# Patient Record
Sex: MALE | Race: WHITE | Employment: OTHER | URBAN - METROPOLITAN AREA
[De-identification: names, ages, dates, MRNs, and addresses within clinical notes are randomized per-mention and may not be internally consistent; named-entity substitution may affect disease eponyms.]

---

## 2017-03-17 ENCOUNTER — TELEPHONE (OUTPATIENT)
Dept: FAMILY MEDICINE CLINIC | Age: 52
End: 2017-03-17

## 2017-05-16 ENCOUNTER — TELEPHONE (OUTPATIENT)
Dept: FAMILY MEDICINE CLINIC | Age: 52
End: 2017-05-16

## 2017-06-16 ENCOUNTER — OFFICE VISIT (OUTPATIENT)
Dept: FAMILY MEDICINE CLINIC | Age: 52
End: 2017-06-16
Payer: MEDICARE

## 2017-06-16 VITALS
SYSTOLIC BLOOD PRESSURE: 142 MMHG | HEART RATE: 112 BPM | WEIGHT: 239 LBS | BODY MASS INDEX: 32.41 KG/M2 | OXYGEN SATURATION: 97 % | DIASTOLIC BLOOD PRESSURE: 90 MMHG

## 2017-06-16 DIAGNOSIS — I10 ESSENTIAL HYPERTENSION: Primary | ICD-10-CM

## 2017-06-16 DIAGNOSIS — F41.9 ANXIETY: ICD-10-CM

## 2017-06-16 DIAGNOSIS — R73.09 ABNORMAL GLUCOSE: ICD-10-CM

## 2017-06-16 DIAGNOSIS — Z12.11 SCREEN FOR COLON CANCER: ICD-10-CM

## 2017-06-16 DIAGNOSIS — Z12.5 SCREENING FOR PROSTATE CANCER: ICD-10-CM

## 2017-06-16 PROCEDURE — 99214 OFFICE O/P EST MOD 30 MIN: CPT | Performed by: FAMILY MEDICINE

## 2017-06-16 RX ORDER — QUETIAPINE FUMARATE 100 MG/1
TABLET, FILM COATED ORAL
Refills: 1 | COMMUNITY
Start: 2017-06-05 | End: 2018-05-11 | Stop reason: SDUPTHER

## 2017-06-16 RX ORDER — BACLOFEN 10 MG/1
TABLET ORAL
Refills: 0 | COMMUNITY
Start: 2017-06-11

## 2017-06-16 RX ORDER — BUSPIRONE HYDROCHLORIDE 7.5 MG/1
7.5 TABLET ORAL 2 TIMES DAILY
Qty: 60 TABLET | Refills: 2 | Status: SHIPPED | OUTPATIENT
Start: 2017-06-16 | End: 2017-07-07 | Stop reason: DRUGHIGH

## 2017-06-16 RX ORDER — DEXTROAMPHETAMINE SACCHARATE, AMPHETAMINE ASPARTATE, DEXTROAMPHETAMINE SULFATE AND AMPHETAMINE SULFATE 2.5; 2.5; 2.5; 2.5 MG/1; MG/1; MG/1; MG/1
TABLET ORAL
Refills: 0 | COMMUNITY
Start: 2017-06-05 | End: 2018-05-11 | Stop reason: SDUPTHER

## 2017-06-30 ASSESSMENT — ENCOUNTER SYMPTOMS
COLOR CHANGE: 0
DIARRHEA: 0
NAUSEA: 0
FACIAL SWELLING: 0
VOMITING: 0
BACK PAIN: 0
ABDOMINAL DISTENTION: 0
COUGH: 0
ABDOMINAL PAIN: 0
CONSTIPATION: 0
WHEEZING: 0
BLURRED VISION: 0
TROUBLE SWALLOWING: 0
PHOTOPHOBIA: 0
ORTHOPNEA: 0
SHORTNESS OF BREATH: 0

## 2017-07-07 ENCOUNTER — TELEPHONE (OUTPATIENT)
Dept: FAMILY MEDICINE CLINIC | Age: 52
End: 2017-07-07

## 2017-07-07 RX ORDER — BUSPIRONE HYDROCHLORIDE 15 MG/1
15 TABLET ORAL 2 TIMES DAILY
Qty: 60 TABLET | Refills: 5 | Status: SHIPPED | OUTPATIENT
Start: 2017-07-07 | End: 2017-12-01 | Stop reason: SDUPTHER

## 2017-08-03 ENCOUNTER — TELEPHONE (OUTPATIENT)
Dept: FAMILY MEDICINE CLINIC | Age: 52
End: 2017-08-03

## 2017-10-27 ENCOUNTER — TELEPHONE (OUTPATIENT)
Dept: FAMILY MEDICINE CLINIC | Age: 52
End: 2017-10-27

## 2017-10-27 NOTE — TELEPHONE ENCOUNTER
adderall 10 mg    DM-keon Abel called into the office today to let Dr. Ambrosio Rodriguez know he 'fired' his therapist today. He said he would see Dr. Rachel Lutz and then the nurse would come in to take his vitals, pusle & bp, and they would charge him $20 every time. He said this was a [facility] case and he should not be charged anything. He is wanting to know if Dr. Ambrosio Rodriguez will call this in for him. He said he would find a new therapist in about a month. Health Maintenance   Topic Date Due    Hepatitis C screen  1965    HIV screen  08/16/1980    DTaP/Tdap/Td vaccine (1 - Tdap) 08/16/1984    Pneumococcal med risk (1 of 1 - PPSV23) 08/16/1984    Lipid screen  08/16/2005    Diabetes screen  08/16/2005    Colon cancer screen colonoscopy  08/16/2015    Flu vaccine (1) 09/01/2017             (applicable per patient's age: Cancer Screenings, Depression Screening, Fall Risk Screening, Immunizations)    AST (U/L)   Date Value   09/22/2014 19     ALT (U/L)   Date Value   09/22/2014 30     BUN (mg/dL)   Date Value   07/30/2015 18      (goal A1C is < 7)   (goal LDL is <100) need 30-50% reduction from baseline     BP Readings from Last 3 Encounters:   06/16/17 (!) 142/90   12/09/16 (!) 160/98   11/07/16 (!) 138/94    (goal /80)      All Future Testing planned in [facility]:  Lab Frequency Next Occurrence   Hemoglobin A1C Once 06/15/2018   PSA Screening Once 06/15/2018   Comprehensive Metabolic Panel Once 14/35/2724   Lipid Panel Once 06/15/2018   POCT Fecal Immunochemical Test (FIT) Once 12/11/2017       Next Visit Date:  No future appointments.          Patient Active Problem List:     DDD (degenerative disc disease), lumbosacral     Herniation of nucleus pulposus     Depression, major, single episode

## 2017-12-01 ENCOUNTER — TELEPHONE (OUTPATIENT)
Dept: FAMILY MEDICINE CLINIC | Age: 52
End: 2017-12-01

## 2017-12-01 RX ORDER — BUSPIRONE HYDROCHLORIDE 15 MG/1
15 TABLET ORAL 2 TIMES DAILY
Qty: 60 TABLET | Refills: 5 | Status: SHIPPED | OUTPATIENT
Start: 2017-12-01 | End: 2018-05-11 | Stop reason: SDUPTHER

## 2018-05-11 ENCOUNTER — OFFICE VISIT (OUTPATIENT)
Dept: FAMILY MEDICINE CLINIC | Age: 53
End: 2018-05-11
Payer: MEDICARE

## 2018-05-11 VITALS
HEART RATE: 73 BPM | HEIGHT: 72 IN | WEIGHT: 238 LBS | DIASTOLIC BLOOD PRESSURE: 94 MMHG | BODY MASS INDEX: 32.23 KG/M2 | OXYGEN SATURATION: 96 % | SYSTOLIC BLOOD PRESSURE: 154 MMHG

## 2018-05-11 DIAGNOSIS — F43.21 ADJUSTMENT DISORDER WITH DEPRESSED MOOD: ICD-10-CM

## 2018-05-11 DIAGNOSIS — F41.9 ANXIETY: Primary | ICD-10-CM

## 2018-05-11 DIAGNOSIS — F90.0 ATTENTION DEFICIT HYPERACTIVITY DISORDER (ADHD), PREDOMINANTLY INATTENTIVE TYPE: ICD-10-CM

## 2018-05-11 PROCEDURE — 99213 OFFICE O/P EST LOW 20 MIN: CPT | Performed by: FAMILY MEDICINE

## 2018-05-11 RX ORDER — FLUOXETINE 10 MG/1
10 CAPSULE ORAL DAILY
Qty: 30 CAPSULE | Refills: 1 | Status: SHIPPED | OUTPATIENT
Start: 2018-05-11

## 2018-05-11 RX ORDER — BUSPIRONE HYDROCHLORIDE 15 MG/1
15 TABLET ORAL 2 TIMES DAILY
Qty: 60 TABLET | Refills: 1 | Status: SHIPPED | OUTPATIENT
Start: 2018-05-11

## 2018-05-11 RX ORDER — DEXTROAMPHETAMINE SACCHARATE, AMPHETAMINE ASPARTATE, DEXTROAMPHETAMINE SULFATE AND AMPHETAMINE SULFATE 2.5; 2.5; 2.5; 2.5 MG/1; MG/1; MG/1; MG/1
TABLET ORAL
Qty: 30 TABLET | Refills: 0 | Status: SHIPPED | OUTPATIENT
Start: 2018-05-11 | End: 2018-06-11 | Stop reason: SDUPTHER

## 2018-05-11 RX ORDER — QUETIAPINE FUMARATE 100 MG/1
TABLET, FILM COATED ORAL
Qty: 60 TABLET | Refills: 1 | Status: SHIPPED | OUTPATIENT
Start: 2018-05-11

## 2018-05-20 ASSESSMENT — ENCOUNTER SYMPTOMS
ABDOMINAL PAIN: 0
FACIAL SWELLING: 0
COUGH: 0
CONSTIPATION: 0
TROUBLE SWALLOWING: 0
NAUSEA: 0
ABDOMINAL DISTENTION: 0
VOMITING: 0
BACK PAIN: 0
PHOTOPHOBIA: 0
SHORTNESS OF BREATH: 0
COLOR CHANGE: 0
WHEEZING: 0
DIARRHEA: 0

## 2018-06-11 ENCOUNTER — OFFICE VISIT (OUTPATIENT)
Dept: FAMILY MEDICINE CLINIC | Age: 53
End: 2018-06-11
Payer: MEDICARE

## 2018-06-11 VITALS
WEIGHT: 232 LBS | SYSTOLIC BLOOD PRESSURE: 130 MMHG | BODY MASS INDEX: 31.42 KG/M2 | DIASTOLIC BLOOD PRESSURE: 82 MMHG | HEIGHT: 72 IN

## 2018-06-11 DIAGNOSIS — F90.0 ATTENTION DEFICIT HYPERACTIVITY DISORDER (ADHD), PREDOMINANTLY INATTENTIVE TYPE: Primary | ICD-10-CM

## 2018-06-11 DIAGNOSIS — Z86.39 HISTORY OF DIABETES MELLITUS: ICD-10-CM

## 2018-06-11 LAB — HBA1C MFR BLD: 6.3 %

## 2018-06-11 PROCEDURE — 99213 OFFICE O/P EST LOW 20 MIN: CPT | Performed by: FAMILY MEDICINE

## 2018-06-11 PROCEDURE — 83036 HEMOGLOBIN GLYCOSYLATED A1C: CPT | Performed by: FAMILY MEDICINE

## 2018-06-11 RX ORDER — DEXTROAMPHETAMINE SACCHARATE, AMPHETAMINE ASPARTATE, DEXTROAMPHETAMINE SULFATE AND AMPHETAMINE SULFATE 2.5; 2.5; 2.5; 2.5 MG/1; MG/1; MG/1; MG/1
TABLET ORAL
Qty: 16 TABLET | Refills: 0 | Status: SHIPPED | OUTPATIENT
Start: 2018-06-11 | End: 2018-09-04

## 2018-06-11 RX ORDER — BUSPIRONE HYDROCHLORIDE 15 MG/1
15 TABLET ORAL 2 TIMES DAILY
Qty: 60 TABLET | Refills: 1 | Status: CANCELLED | OUTPATIENT
Start: 2018-06-11

## 2018-06-11 RX ORDER — FLUOXETINE 10 MG/1
10 CAPSULE ORAL DAILY
Qty: 30 CAPSULE | Refills: 1 | Status: CANCELLED | OUTPATIENT
Start: 2018-06-11

## 2018-06-11 RX ORDER — QUETIAPINE FUMARATE 100 MG/1
TABLET, FILM COATED ORAL
Qty: 60 TABLET | Refills: 1 | Status: CANCELLED | OUTPATIENT
Start: 2018-06-11

## 2018-06-11 ASSESSMENT — PATIENT HEALTH QUESTIONNAIRE - PHQ9
SUM OF ALL RESPONSES TO PHQ9 QUESTIONS 1 & 2: 2
2. FEELING DOWN, DEPRESSED OR HOPELESS: 1
1. LITTLE INTEREST OR PLEASURE IN DOING THINGS: 1
SUM OF ALL RESPONSES TO PHQ QUESTIONS 1-9: 2

## 2018-06-12 ASSESSMENT — ENCOUNTER SYMPTOMS: RESPIRATORY NEGATIVE: 1

## 2018-08-14 ENCOUNTER — HOSPITAL ENCOUNTER (EMERGENCY)
Age: 53
Discharge: HOME | End: 2018-08-14
Attending: FAMILY MEDICINE
Payer: MEDICARE

## 2018-08-14 VITALS
HEIGHT: 72 IN | TEMPERATURE: 98.3 F | HEART RATE: 110 BPM | BODY MASS INDEX: 33.32 KG/M2 | SYSTOLIC BLOOD PRESSURE: 156 MMHG | DIASTOLIC BLOOD PRESSURE: 89 MMHG | RESPIRATION RATE: 18 BRPM | WEIGHT: 246 LBS | OXYGEN SATURATION: 97 %

## 2018-08-14 DIAGNOSIS — T24.231A PARTIAL THICKNESS BURN OF RIGHT LOWER LEG, INITIAL ENCOUNTER: Primary | ICD-10-CM

## 2018-08-14 DIAGNOSIS — Z78.9 DIPHTHERIA-PERTUSSIS-TETANUS (DPT) VACCINATION ADMINISTERED AT CURRENT VISIT: ICD-10-CM

## 2018-08-14 DIAGNOSIS — T24.132A SUPERFICIAL BURN OF LEFT LOWER LEG, INITIAL ENCOUNTER: ICD-10-CM

## 2018-08-14 PROCEDURE — 90715 TDAP VACCINE 7 YRS/> IM: CPT | Performed by: FAMILY MEDICINE

## 2018-08-14 PROCEDURE — 90471 IMMUNIZATION ADMIN: CPT | Performed by: FAMILY MEDICINE

## 2018-08-14 PROCEDURE — 6360000002 HC RX W HCPCS: Performed by: FAMILY MEDICINE

## 2018-08-14 PROCEDURE — 6370000000 HC RX 637 (ALT 250 FOR IP): Performed by: FAMILY MEDICINE

## 2018-08-14 PROCEDURE — 99282 EMERGENCY DEPT VISIT SF MDM: CPT

## 2018-08-14 RX ORDER — CEPHALEXIN 500 MG/1
500 CAPSULE ORAL 3 TIMES DAILY
Qty: 30 CAPSULE | Refills: 0 | Status: SHIPPED | OUTPATIENT
Start: 2018-08-14 | End: 2018-08-24

## 2018-08-14 RX ORDER — SULFAMETHOXAZOLE AND TRIMETHOPRIM 800; 160 MG/1; MG/1
1 TABLET ORAL ONCE
Status: COMPLETED | OUTPATIENT
Start: 2018-08-14 | End: 2018-08-14

## 2018-08-14 RX ORDER — CEPHALEXIN 500 MG/1
500 CAPSULE ORAL ONCE
Status: COMPLETED | OUTPATIENT
Start: 2018-08-14 | End: 2018-08-14

## 2018-08-14 RX ORDER — SULFAMETHOXAZOLE AND TRIMETHOPRIM 800; 160 MG/1; MG/1
1 TABLET ORAL 2 TIMES DAILY
Qty: 20 TABLET | Refills: 0 | Status: SHIPPED | OUTPATIENT
Start: 2018-08-14 | End: 2018-08-24

## 2018-08-14 RX ADMIN — TETANUS TOXOID, REDUCED DIPHTHERIA TOXOID AND ACELLULAR PERTUSSIS VACCINE, ADSORBED 0.5 ML: 5; 2.5; 8; 8; 2.5 SUSPENSION INTRAMUSCULAR at 22:21

## 2018-08-14 RX ADMIN — CEPHALEXIN 500 MG: 500 CAPSULE ORAL at 22:20

## 2018-08-14 RX ADMIN — SULFAMETHOXAZOLE AND TRIMETHOPRIM 1 TABLET: 800; 160 TABLET ORAL at 22:20

## 2018-08-14 ASSESSMENT — PAIN DESCRIPTION - FREQUENCY: FREQUENCY: CONTINUOUS

## 2018-08-14 ASSESSMENT — PAIN SCALES - GENERAL: PAINLEVEL_OUTOF10: 6

## 2018-08-14 ASSESSMENT — PAIN DESCRIPTION - LOCATION: LOCATION: ABDOMEN

## 2018-08-14 ASSESSMENT — PAIN DESCRIPTION - ORIENTATION: ORIENTATION: RIGHT;LEFT

## 2018-08-14 ASSESSMENT — PAIN DESCRIPTION - PROGRESSION: CLINICAL_PROGRESSION: NOT CHANGED

## 2018-08-14 ASSESSMENT — PAIN DESCRIPTION - DESCRIPTORS: DESCRIPTORS: CONSTANT
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
## 2018-08-22 ENCOUNTER — OFFICE VISIT (OUTPATIENT)
Dept: FAMILY MEDICINE CLINIC | Age: 53
End: 2018-08-22
Payer: MEDICARE

## 2018-08-22 VITALS
OXYGEN SATURATION: 98 % | WEIGHT: 236 LBS | TEMPERATURE: 98.2 F | DIASTOLIC BLOOD PRESSURE: 84 MMHG | SYSTOLIC BLOOD PRESSURE: 124 MMHG | BODY MASS INDEX: 32.01 KG/M2 | HEART RATE: 100 BPM

## 2018-08-22 DIAGNOSIS — T24.231D PARTIAL THICKNESS BURN OF RIGHT LOWER LEG, SUBSEQUENT ENCOUNTER: Primary | ICD-10-CM

## 2018-08-22 DIAGNOSIS — T24.132D: ICD-10-CM

## 2018-08-22 PROCEDURE — 99214 OFFICE O/P EST MOD 30 MIN: CPT | Performed by: NURSE PRACTITIONER
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
## 2018-08-23 ASSESSMENT — ENCOUNTER SYMPTOMS
NAUSEA: 0
DIARRHEA: 0
VOMITING: 0
COUGH: 0
SHORTNESS OF BREATH: 0

## 2018-08-31 ENCOUNTER — OFFICE VISIT (OUTPATIENT)
Dept: PRIMARY CARE CLINIC | Age: 53
End: 2018-08-31
Payer: MEDICARE

## 2018-08-31 VITALS
HEART RATE: 83 BPM | OXYGEN SATURATION: 100 % | WEIGHT: 236 LBS | TEMPERATURE: 97.7 F | BODY MASS INDEX: 32.01 KG/M2 | DIASTOLIC BLOOD PRESSURE: 78 MMHG | SYSTOLIC BLOOD PRESSURE: 122 MMHG

## 2018-08-31 DIAGNOSIS — T24.231S PARTIAL THICKNESS BURN OF RIGHT LOWER LEG, SEQUELA: Primary | ICD-10-CM

## 2018-08-31 PROCEDURE — 99213 OFFICE O/P EST LOW 20 MIN: CPT | Performed by: NURSE PRACTITIONER

## 2018-08-31 ASSESSMENT — ENCOUNTER SYMPTOMS
EYES NEGATIVE: 1
WHEEZING: 0
RESPIRATORY NEGATIVE: 1
CHEST TIGHTNESS: 0
COUGH: 0
TROUBLE SWALLOWING: 0
FACIAL SWELLING: 0
STRIDOR: 0
SHORTNESS OF BREATH: 0
SORE THROAT: 0

## 2018-08-31 NOTE — PATIENT INSTRUCTIONS
SURVEY:    You may be receiving a survey from [employer] regarding your visit today. Please complete the survey to enable us to provide the highest quality of care to you and your family. If you cannot score us as very good on any question, please call the office to discuss how we could have made your experience exceptional.     Thank you.

## 2018-09-04 ENCOUNTER — HOSPITAL ENCOUNTER (EMERGENCY)
Age: 53
Discharge: HOME | End: 2018-09-04
Attending: EMERGENCY MEDICINE
Payer: MEDICARE

## 2018-09-04 VITALS — DIASTOLIC BLOOD PRESSURE: 87 MMHG | RESPIRATION RATE: 18 BRPM | SYSTOLIC BLOOD PRESSURE: 163 MMHG

## 2018-09-04 DIAGNOSIS — T25.211S PARTIAL THICKNESS BURN OF RIGHT ANKLE, SEQUELA: Primary | ICD-10-CM

## 2018-09-04 PROCEDURE — 99283 EMERGENCY DEPT VISIT LOW MDM: CPT

## 2018-09-04 PROCEDURE — 6370000000 HC RX 637 (ALT 250 FOR IP): Performed by: EMERGENCY MEDICINE

## 2018-09-04 RX ORDER — SULFAMETHOXAZOLE AND TRIMETHOPRIM 800; 160 MG/1; MG/1
1 TABLET ORAL 2 TIMES DAILY
Qty: 14 TABLET | Refills: 0 | Status: SHIPPED | OUTPATIENT
Start: 2018-09-04 | End: 2018-09-11

## 2018-09-04 RX ORDER — HYDROXYZINE PAMOATE 25 MG/1
25 CAPSULE ORAL 3 TIMES DAILY PRN
Qty: 21 CAPSULE | Refills: 0 | Status: SHIPPED | OUTPATIENT
Start: 2018-09-04 | End: 2018-09-06 | Stop reason: SDUPTHER

## 2018-09-04 RX ORDER — SULFAMETHOXAZOLE AND TRIMETHOPRIM 800; 160 MG/1; MG/1
1 TABLET ORAL ONCE
Status: COMPLETED | OUTPATIENT
Start: 2018-09-04 | End: 2018-09-04

## 2018-09-04 RX ORDER — HYDROXYZINE PAMOATE 25 MG/1
25 CAPSULE ORAL ONCE
Status: COMPLETED | OUTPATIENT
Start: 2018-09-04 | End: 2018-09-04

## 2018-09-04 RX ADMIN — SULFAMETHOXAZOLE AND TRIMETHOPRIM 1 TABLET: 800; 160 TABLET ORAL at 21:43

## 2018-09-04 RX ADMIN — HYDROXYZINE PAMOATE 25 MG: 25 CAPSULE ORAL at 21:43

## 2018-09-04 ASSESSMENT — PAIN DESCRIPTION - ORIENTATION: ORIENTATION: RIGHT

## 2018-09-04 ASSESSMENT — PAIN DESCRIPTION - PAIN TYPE: TYPE: ACUTE PAIN

## 2018-09-04 ASSESSMENT — PAIN DESCRIPTION - FREQUENCY: FREQUENCY: INTERMITTENT

## 2018-09-04 ASSESSMENT — PAIN DESCRIPTION - DESCRIPTORS: DESCRIPTORS: BURNING

## 2018-09-04 ASSESSMENT — PAIN DESCRIPTION - LOCATION: LOCATION: FOOT

## 2018-09-04 ASSESSMENT — PAIN SCALES - GENERAL: PAINLEVEL_OUTOF10: 5

## 2018-09-05 ENCOUNTER — HOSPITAL ENCOUNTER (EMERGENCY)
Age: 53
Discharge: HOME | End: 2018-09-05
Attending: FAMILY MEDICINE
Payer: MEDICARE

## 2018-09-05 ENCOUNTER — OFFICE VISIT (OUTPATIENT)
Dept: FAMILY MEDICINE CLINIC | Age: 53
End: 2018-09-05
Payer: MEDICARE

## 2018-09-05 VITALS
OXYGEN SATURATION: 97 % | RESPIRATION RATE: 16 BRPM | BODY MASS INDEX: 31.29 KG/M2 | TEMPERATURE: 97.8 F | DIASTOLIC BLOOD PRESSURE: 76 MMHG | HEART RATE: 106 BPM | HEIGHT: 72 IN | SYSTOLIC BLOOD PRESSURE: 150 MMHG | WEIGHT: 231 LBS

## 2018-09-05 VITALS
WEIGHT: 230 LBS | TEMPERATURE: 97.5 F | BODY MASS INDEX: 31.19 KG/M2 | SYSTOLIC BLOOD PRESSURE: 136 MMHG | DIASTOLIC BLOOD PRESSURE: 80 MMHG | OXYGEN SATURATION: 95 % | HEART RATE: 112 BPM

## 2018-09-05 DIAGNOSIS — T24.201D BURN OF RIGHT LEG, SECOND DEGREE, SUBSEQUENT ENCOUNTER: Primary | ICD-10-CM

## 2018-09-05 DIAGNOSIS — L03.115 CELLULITIS OF RIGHT LOWER LEG: ICD-10-CM

## 2018-09-05 DIAGNOSIS — T24.201S: Primary | ICD-10-CM

## 2018-09-05 DIAGNOSIS — T14.8XXD DELAYED WOUND HEALING: ICD-10-CM

## 2018-09-05 LAB
ABSOLUTE EOS #: 0.1 K/UL (ref 0–0.4)
ABSOLUTE IMMATURE GRANULOCYTE: ABNORMAL K/UL (ref 0–0.3)
ABSOLUTE LYMPH #: 0.8 K/UL (ref 1–4.8)
ABSOLUTE MONO #: 0.3 K/UL (ref 0–1)
ANION GAP SERPL CALCULATED.3IONS-SCNC: 14 MMOL/L (ref 9–17)
BASOPHILS # BLD: 0 % (ref 0–2)
BASOPHILS ABSOLUTE: 0 K/UL (ref 0–0.2)
BUN BLDV-MCNC: 14 MG/DL (ref 6–20)
BUN/CREAT BLD: 18 (ref 9–20)
CALCIUM SERPL-MCNC: 9.2 MG/DL (ref 8.6–10.4)
CHLORIDE BLD-SCNC: 99 MMOL/L (ref 98–107)
CO2: 24 MMOL/L (ref 20–31)
CREAT SERPL-MCNC: 0.78 MG/DL (ref 0.7–1.2)
DIFFERENTIAL TYPE: YES
EOSINOPHILS RELATIVE PERCENT: 2 % (ref 0–5)
GFR AFRICAN AMERICAN: >60 ML/MIN
GFR NON-AFRICAN AMERICAN: >60 ML/MIN
GFR SERPL CREATININE-BSD FRML MDRD: ABNORMAL ML/MIN/{1.73_M2}
GFR SERPL CREATININE-BSD FRML MDRD: ABNORMAL ML/MIN/{1.73_M2}
GLUCOSE BLD-MCNC: 139 MG/DL (ref 70–99)
HCT VFR BLD CALC: 42.5 % (ref 41–53)
HEMOGLOBIN: 14.8 G/DL (ref 13.5–17.5)
IMMATURE GRANULOCYTES: ABNORMAL %
LYMPHOCYTES # BLD: 16 % (ref 13–44)
MCH RBC QN AUTO: 35.2 PG (ref 26–34)
MCHC RBC AUTO-ENTMCNC: 34.9 G/DL (ref 31–37)
MCV RBC AUTO: 100.8 FL (ref 80–100)
MONOCYTES # BLD: 7 % (ref 5–9)
NRBC AUTOMATED: ABNORMAL PER 100 WBC
PDW BLD-RTO: 13 % (ref 12.1–15.2)
PLATELET # BLD: 209 K/UL (ref 140–450)
PLATELET ESTIMATE: ABNORMAL
PMV BLD AUTO: ABNORMAL FL (ref 6–12)
POTASSIUM SERPL-SCNC: 4.1 MMOL/L (ref 3.7–5.3)
RBC # BLD: 4.22 M/UL (ref 4.5–5.9)
RBC # BLD: ABNORMAL 10*6/UL
SEG NEUTROPHILS: 75 % (ref 39–75)
SEGMENTED NEUTROPHILS ABSOLUTE COUNT: 3.9 K/UL (ref 2.1–6.5)
SODIUM BLD-SCNC: 137 MMOL/L (ref 135–144)
WBC # BLD: 5.2 K/UL (ref 3.5–11)
WBC # BLD: ABNORMAL 10*3/UL

## 2018-09-05 PROCEDURE — 96365 THER/PROPH/DIAG IV INF INIT: CPT

## 2018-09-05 PROCEDURE — 6360000002 HC RX W HCPCS: Performed by: FAMILY MEDICINE

## 2018-09-05 PROCEDURE — 80048 BASIC METABOLIC PNL TOTAL CA: CPT

## 2018-09-05 PROCEDURE — 99214 OFFICE O/P EST MOD 30 MIN: CPT | Performed by: NURSE PRACTITIONER

## 2018-09-05 PROCEDURE — 99283 EMERGENCY DEPT VISIT LOW MDM: CPT

## 2018-09-05 PROCEDURE — 85025 COMPLETE CBC W/AUTO DIFF WBC: CPT

## 2018-09-05 PROCEDURE — 2580000003 HC RX 258: Performed by: FAMILY MEDICINE

## 2018-09-05 RX ORDER — SULFAMETHOXAZOLE AND TRIMETHOPRIM 800; 160 MG/1; MG/1
1 TABLET ORAL 2 TIMES DAILY
Qty: 20 TABLET | Refills: 0 | Status: SHIPPED | OUTPATIENT
Start: 2018-09-05 | End: 2018-09-15

## 2018-09-05 RX ADMIN — VANCOMYCIN HYDROCHLORIDE 1000 MG: 1 INJECTION, POWDER, LYOPHILIZED, FOR SOLUTION INTRAVENOUS at 18:07

## 2018-09-05 NOTE — PATIENT INSTRUCTIONS
SURVEY:    You may be receiving a survey from [employer] regarding your visit today. Please complete the survey to enable us to provide the highest quality of care to you and your family. If you cannot score us a very good on any question, please call the office to discuss how we could have made your experience a very good one. Thank you.

## 2018-09-05 NOTE — ED NOTES
Dressing applied as ordered to right leg: adaptic and kerlix wrap with assistance of Diego Jamison RN. Patient states he is going to his appointment with Dr. Marj Moore and will follow up with the wound clinic.       Mila Jimenez RN  09/04/18 0042

## 2018-09-05 NOTE — ED NOTES
Wound cleansed with hibacleanse, applied adaptic and kerlix to wound as ordered. Patient tolerated well.  Patient given phone number for wound clinic and sates he will call [facility] wound clinic in AM.      Lucila Heaton RN  09/05/18 [id number]

## 2018-09-06 RX ORDER — HYDROXYZINE PAMOATE 25 MG/1
25 CAPSULE ORAL 3 TIMES DAILY PRN
Qty: 21 CAPSULE | Refills: 0 | Status: SHIPPED | OUTPATIENT
Start: 2018-09-06 | End: 2018-09-13
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
## 2018-09-07 ENCOUNTER — NURSE ONLY (OUTPATIENT)
Dept: FAMILY MEDICINE CLINIC | Age: 53
End: 2018-09-07

## 2018-09-07 DIAGNOSIS — T24.231D PARTIAL THICKNESS BURN OF RIGHT LOWER LEG, SUBSEQUENT ENCOUNTER: ICD-10-CM

## 2018-09-07 PROBLEM — T24.031A BURN OF RIGHT LOWER LEG: Status: ACTIVE | Noted: 2018-09-07

## 2018-09-09 ASSESSMENT — ENCOUNTER SYMPTOMS
VOMITING: 0
COUGH: 0
DIARRHEA: 0
NAUSEA: 0
SHORTNESS OF BREATH: 0

## 2020-12-26 NOTE — PROGRESS NOTES
HPI Notes    Name: Lopez Cárdenas  : 1965         Chief Complaint:     Chief Complaint   Patient presents with    Burn     Patient here today for recheck on burn on right lower leg. Patient was seen in the ED last night and walk in clinic on 18. ED advised him to see a wound clinic. History of Present Illness:        HPI  Pt is a 47 yo male who reports for reevaluation of a right lower leg burn. Pt has been leaving burn open to air. 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 Pt reports more pain and swelling to right lower leg. Pt denies any fever. Past Medical History:     Past Medical History:   Diagnosis Date    Back injury     crush injury/work injury     Depression     Hypertension       Reviewed all health maintenance requirements and ordered appropriate tests  Health Maintenance Due   Topic Date Due    Hepatitis C screen  1965    HIV screen  1980    Pneumococcal med risk (1 of 1 - PPSV23) 1984    Lipid screen  2005    Shingles Vaccine (1 of 2 - 2 Dose Series) 2015    Colon cancer screen colonoscopy  2015    Flu vaccine (1) 2018       Past Surgical History:     Past Surgical History:   Procedure Laterality Date    BACK SURGERY      4- 5; simulator implant        Medications:       Prior to Admission medications    Medication Sig Start Date End Date Taking? Authorizing Provider   hydrOXYzine (VISTARIL) 25 MG capsule Take 1 capsule by mouth 3 times daily as needed for Anxiety 18 Yes RUDOLPH Leiva CNP   LYRICA 200 MG capsule  18  Yes Historical Provider, MD   silver sulfADIAZINE (SILVADENE) 1 % cream Apply topically daily.  18  Yes RUDOLPH Leiva CNP   busPIRone (BUSPAR) 15 MG tablet Take 15 mg by mouth 2 times daily 18  Yes Jennifer Martínez DO   QUEtiapine (SEROQUEL) 100 MG tablet TAKE 2 TABLETS BY MOUTH every night 18  Yes Jennifer Martínez DO   FLUoxetine (PROZAC) 10 MG capsule Take 1 capsule by mouth daily 5/11/18  Yes Mavis Dow DO   baclofen (LIORESAL) 10 MG tablet  6/11/17  Yes Historical Provider, MD   diclofenac 1.5 % SOLN APPLY 2 (TWO) pumps to skin twice a day AS NEEDED FOR PAIN 3/15/17  Yes Historical Provider, MD   ibuprofen (ADVIL;MOTRIN) 800 MG tablet take 1 tablet by mouth three times a day after meals if needed for pain 10/19/16  Yes Historical Provider, MD   naproxen (EC NAPROSYN) 500 MG EC tablet Take 500 mg by mouth 2 times daily (with meals)   Yes Historical Provider, MD   morphine (MSIR) 30 MG tablet Take 30 mg by mouth three times daily   Yes Historical Provider, MD   NONFORMULARY    Yes Historical Provider, MD   oxyCODONE-acetaminophen (PERCOCET)  MG per tablet Take 1 tablet by mouth every 4 hours as needed for Pain. Yes Historical Provider, MD   sulfamethoxazole-trimethoprim (BACTRIM DS) 800-160 MG per tablet Take 1 tablet by mouth 2 times daily for 10 days 9/5/18 9/15/18  Sandy Pittman MD   sulfamethoxazole-trimethoprim (BACTRIM DS) 800-160 MG per tablet Take 1 tablet by mouth 2 times daily for 7 days 9/4/18 9/11/18  Sergio Ray MD   amphetamine-dextroamphetamine (ADDERALL) 10 MG tablet TAKE 1 TABLET BY MOUTH TWICE DAILY. 6/11/18 9/4/18  Sharon Rodriguez DO        Allergies:       Iodine    Social History:     Tobacco:    reports that he has been smoking Cigarettes. He has a 35.00 pack-year smoking history. He has never used smokeless tobacco.  Alcohol:      reports that he does not drink alcohol. Drug Use:  reports that he does not use drugs. Family History:     No family history on file. Review of Systems:         Review of Systems   Constitutional: Negative for chills and fever. Respiratory: Negative for cough and shortness of breath. Cardiovascular: Negative for chest pain and palpitations. Gastrointestinal: Negative for diarrhea, nausea and vomiting.          Physical Exam: regarding wound care. 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 Pt has not developed third spacing and weeping most likely from cellulitis. Wound pictures were sent to wound care doctor at [facility] [facility]. That physician recommended inpatient admission for IV antibiotics and wound care. Pt was sent to ED. Verbal report given to Dr Sulaiman Lowery. 750 Robert Beltran MD    [id number] - ID DRESS/DEBRID MED BURN NO ANESTH   2. Delayed wound healing  750 Robert Beltran MD     Patient verbalizes understanding and agreement with plan. All questions answered. Completed Refills   Requested Prescriptions     Signed Prescriptions Disp Refills    hydrOXYzine (VISTARIL) 25 MG capsule 21 capsule 0     Sig: Take 1 capsule by mouth 3 times daily as needed for Anxiety     No Follow-up on file. Orders Placed This Encounter   Medications    hydrOXYzine (VISTARIL) 25 MG capsule     Sig: Take 1 capsule by mouth 3 times daily as needed for Anxiety     Dispense:  21 capsule     Refill:  0     Orders Placed This Encounter   Procedures   750 Robert Beltran MD     Referral Priority:   Routine     Referral Type:   Eval and Treat     Referral Reason:   Specialty Services Required     Referred to Provider:   Connor Martin MD     Requested Specialty:   General Surgery     Number of Visits Requested:   1    [id number] - ID DRESS/DEBRID MED BURN NO ANESTH         Patient Instructions     SURVEY:    You may be receiving a survey from [employer] regarding your visit today. Please complete the survey to enable us to provide the highest quality of care to you and your family. If you cannot score us a very good on any question, please call the office to discuss how we could have made your experience a very good one. Thank you.       Electronically signed by RUDOLPH Moss CNP on 9/9/2018 at 11:05 AM           Completed Refills   Requested Prescriptions     Signed Prescriptions Disp Refills    hydrOXYzine (VISTARIL) 25 MG capsule 21 capsule 0     Sig: Take 1 capsule by mouth 3 times daily as needed for Anxiety         Charissa  received counseling on the following healthy behaviors: medication adherence and dressing change  Reviewed prior labs and health maintenance. Continue current medications, diet and exercise. Discussed use, benefit, and side effects of prescribed medications. Barriers to medication compliance addressed. Patient given educational materials - see patient instructions. All patient questions answered. Patient voiced understanding. Yes